# Patient Record
Sex: MALE | Race: AMERICAN INDIAN OR ALASKA NATIVE
[De-identification: names, ages, dates, MRNs, and addresses within clinical notes are randomized per-mention and may not be internally consistent; named-entity substitution may affect disease eponyms.]

---

## 2018-01-11 ENCOUNTER — HOSPITAL ENCOUNTER (EMERGENCY)
Dept: HOSPITAL 31 - C.ER | Age: 53
Discharge: HOME | End: 2018-01-11
Payer: COMMERCIAL

## 2018-01-11 VITALS
RESPIRATION RATE: 20 BRPM | DIASTOLIC BLOOD PRESSURE: 72 MMHG | TEMPERATURE: 98 F | HEART RATE: 82 BPM | SYSTOLIC BLOOD PRESSURE: 140 MMHG

## 2018-01-11 VITALS — OXYGEN SATURATION: 98 %

## 2018-01-11 DIAGNOSIS — E78.00: ICD-10-CM

## 2018-01-11 DIAGNOSIS — I10: ICD-10-CM

## 2018-01-11 DIAGNOSIS — E10.9: Primary | ICD-10-CM

## 2018-01-11 LAB
ALBUMIN SERPL-MCNC: 4 G/DL (ref 3.5–5)
ALBUMIN/GLOB SERPL: 1 {RATIO} (ref 1–2.1)
ALT SERPL-CCNC: 38 U/L (ref 21–72)
AST SERPL-CCNC: 28 U/L (ref 17–59)
BASOPHILS # BLD AUTO: 0 K/UL (ref 0–0.2)
BASOPHILS NFR BLD: 0.3 % (ref 0–2)
BUN SERPL-MCNC: 18 MG/DL (ref 9–20)
CALCIUM SERPL-MCNC: 8.4 MG/DL (ref 8.6–10.4)
EOSINOPHIL # BLD AUTO: 0 K/UL (ref 0–0.7)
EOSINOPHIL NFR BLD: 0.5 % (ref 0–4)
ERYTHROCYTE [DISTWIDTH] IN BLOOD BY AUTOMATED COUNT: 14.6 % (ref 11.5–14.5)
GFR NON-AFRICAN AMERICAN: > 60
HGB BLD-MCNC: 15.2 G/DL (ref 12–18)
LYMPHOCYTES # BLD AUTO: 2.4 K/UL (ref 1–4.3)
LYMPHOCYTES NFR BLD AUTO: 28.4 % (ref 20–40)
MCH RBC QN AUTO: 27.9 PG (ref 27–31)
MCHC RBC AUTO-ENTMCNC: 32.7 G/DL (ref 33–37)
MCV RBC AUTO: 85.3 FL (ref 80–94)
MONOCYTES # BLD: 0.8 K/UL (ref 0–0.8)
MONOCYTES NFR BLD: 8.8 % (ref 0–10)
NEUTROPHILS # BLD: 5.3 K/UL (ref 1.8–7)
NEUTROPHILS NFR BLD AUTO: 62 % (ref 50–75)
NRBC BLD AUTO-RTO: 0.1 % (ref 0–2)
PLATELET # BLD: 237 K/UL (ref 130–400)
PMV BLD AUTO: 9.2 FL (ref 7.2–11.7)
RBC # BLD AUTO: 5.46 MIL/UL (ref 4.4–5.9)
WBC # BLD AUTO: 8.6 K/UL (ref 4.8–10.8)

## 2018-01-11 NOTE — C.PDOC
History Of Present Illness


52 yr old male with PMHx if diabetes, presents to the ER stating he had a brief 

period of confusion earlier today. Patient is accompanied by wife, who states 

the patient didn't take his insulin on time. Patient denies fever, chills, 

chest pain, SOB, nausea, vomiting, abdominal pain, diarrhea, headache, weakness 

or nmbness. 


Time Seen by Provider: 01/11/18 19:24


Chief Complaint (Nursing): Altered Mental Status


History Per: Patient


History/Exam Limitations: None


Onset/Duration Of Symptoms: Sudden Onset (PTA)





Past Medical History


Reviewed: Historical Data, Nursing Documentation, Vital Signs


Vital Signs: 


 Last Vital Signs











Temp  98 F   01/11/18 21:18


 


Pulse  82   01/11/18 21:18


 


Resp  20   01/11/18 21:18


 


BP  140/72   01/11/18 21:18


 


Pulse Ox  98   01/11/18 21:18














- Medical History


PMH: Diabetes, HTN, Hypercholesterolemia


Family History: States: No Known Family Hx





- Social History


Hx Tobacco Use: No


Hx Alcohol Use: No


Hx Substance Use: No





- Immunization History


Hx Tetanus Toxoid Vaccination: No


Hx Influenza Vaccination: No


Hx Pneumococcal Vaccination: No





Review Of Systems


Except As Marked, All Systems Reviewed And Found Negative.


Constitutional: Negative for: Fever, Chills


Cardiovascular: Negative for: Chest Pain


Respiratory: Negative for: Shortness of Breath


Gastrointestinal: Negative for: Nausea, Vomiting, Abdominal Pain, Diarrhea


Neurological: Positive for: Confusion.  Negative for: Weakness, Numbness, 

Headache





Physical Exam





- Physical Exam


Appears: Non-toxic, No Acute Distress, Other ((+) morbidly obese)


Skin: Warm, Dry, No Rash


Head: Atraumatic, Normacephalic


Eye(s): bilateral: Normal Inspection, PERRL, EOMI


Oral Mucosa: Moist


Chest: Symmetrical, No Tenderness


Cardiovascular: Rhythm Regular, No Murmur


Respiratory: Normal Breath Sounds, No Rales, No Rhonchi, No Stridor, No Wheezing


Gastrointestinal/Abdominal: Normal Exam, Soft, No Tenderness, No Guarding, No 

Rebound


Extremity: Normal ROM, No Swelling


Neurological/Psych: Oriented x3, Normal Speech, Normal Motor, Normal Sensation





ED Course And Treatment





- Laboratory Results


Result Diagrams: 


 01/11/18 19:34





 01/11/18 19:34


O2 Sat by Pulse Oximetry: 98 (RA)


Pulse Ox Interpretation: Normal





Medical Decision Making


Medical Decision Making: 


PLAN:


* CXR


* CBC


* CMP


* Urinalysis 








Disposition





- Disposition


Referrals: 


[facility] [Outside]


Disposition: HOME/ ROUTINE


Disposition Time: 23:56


Condition: GOOD


Instructions:  Diabetes Mellitus Type 1 in Adults (ED)


Forms:  [facility] (English)


Print Language: ENGLISH





- Clinical Impression


Clinical Impression: 


 Diabetes 1.5, managed as type 1








- Scribe Statement


The provider has reviewed the documentation as recorded by the Darron Dai


Provider Attestation: 


All medical record entries made by the Marioniblasha were at my direction and 

personally dictated by me. I have reviewed the chart and agree that the record 

accurately reflects my personal performance of the history, physical exam, 

medical decision making, and the department course for this patient. I have 

also personally directed, reviewed, and agree with the discharge instructions 

and disposition.

## 2018-01-12 NOTE — RAD
HISTORY:

altered mentation  



COMPARISON:

None available. 



TECHNIQUE:

Chest PA and lateral



FINDINGS:

Examination limited by habitus.



LUNGS:

No focal consolidation.



Please note that chest x-ray has limited sensitivity for the 

detection of pulmonary masses.



PLEURA:

No significant pleural effusion identified. No definite pneumothorax .



CARDIOVASCULAR:

Heart size appears within normal limits.



OSSEOUS STRUCTURES:

 Degenerative changes of the spine including confluent anterior 

osteophyte formation.



VISUALIZED UPPER ABDOMEN:

Mild elevation of the right hemidiaphragm.



OTHER FINDINGS:

None.



IMPRESSION:

No focal consolidation, significant pleural effusion, or definite 

pneumothorax identified.